# Patient Record
Sex: MALE | Race: WHITE | NOT HISPANIC OR LATINO | Employment: OTHER | URBAN - NONMETROPOLITAN AREA
[De-identification: names, ages, dates, MRNs, and addresses within clinical notes are randomized per-mention and may not be internally consistent; named-entity substitution may affect disease eponyms.]

---

## 2023-12-11 ENCOUNTER — OFFICE VISIT (OUTPATIENT)
Dept: NEUROSURGERY | Facility: CLINIC | Age: 68
End: 2023-12-11
Payer: MEDICARE

## 2023-12-11 VITALS — WEIGHT: 267.2 LBS | HEIGHT: 73 IN | BODY MASS INDEX: 35.41 KG/M2

## 2023-12-11 DIAGNOSIS — M48.061 SPINAL STENOSIS, LUMBAR REGION, WITHOUT NEUROGENIC CLAUDICATION: ICD-10-CM

## 2023-12-11 DIAGNOSIS — E66.09 CLASS 2 OBESITY DUE TO EXCESS CALORIES WITH BODY MASS INDEX (BMI) OF 35.0 TO 35.9 IN ADULT, UNSPECIFIED WHETHER SERIOUS COMORBIDITY PRESENT: ICD-10-CM

## 2023-12-11 DIAGNOSIS — M54.16 LUMBAR RADICULOPATHY: ICD-10-CM

## 2023-12-11 DIAGNOSIS — M51.36 DDD (DEGENERATIVE DISC DISEASE), LUMBAR: Primary | ICD-10-CM

## 2023-12-11 DIAGNOSIS — Z78.9 NONSMOKER: ICD-10-CM

## 2023-12-11 RX ORDER — LIRAGLUTIDE 6 MG/ML
0.6 INJECTION SUBCUTANEOUS DAILY
COMMUNITY

## 2023-12-11 RX ORDER — TESTOSTERONE CYPIONATE 200 MG/ML
VIAL (ML) INTRAMUSCULAR
COMMUNITY
Start: 2023-09-29

## 2023-12-11 RX ORDER — AMOXICILLIN 500 MG
1 CAPSULE ORAL
COMMUNITY

## 2023-12-11 RX ORDER — ASPIRIN 81 MG/1
81 TABLET ORAL DAILY
COMMUNITY
Start: 2023-07-10

## 2023-12-11 RX ORDER — EZETIMIBE 10 MG
TABLET ORAL
COMMUNITY
Start: 2013-01-10

## 2023-12-11 RX ORDER — MAGNESIUM 64 MG (MAGNESIUM CHLORIDE) TABLET,DELAYED RELEASE
128
COMMUNITY
Start: 2016-06-10

## 2023-12-11 RX ORDER — NITROGLYCERIN 0.4 MG/1
TABLET SUBLINGUAL
COMMUNITY
Start: 2009-07-10

## 2023-12-11 RX ORDER — METOPROLOL SUCCINATE 25 MG/1
TABLET, EXTENDED RELEASE ORAL
COMMUNITY
Start: 2023-12-07

## 2023-12-11 RX ORDER — ZOLPIDEM TARTRATE 10 MG/1
10 TABLET ORAL
COMMUNITY
Start: 2003-12-10

## 2023-12-11 RX ORDER — RANOLAZINE 1000 MG/1
TABLET, EXTENDED RELEASE ORAL
COMMUNITY
Start: 2017-09-10

## 2023-12-11 RX ORDER — COLCHICINE 0.6 MG/1
0.6 TABLET ORAL
COMMUNITY

## 2023-12-11 RX ORDER — AMOXICILLIN 500 MG
CAPSULE ORAL
COMMUNITY
Start: 1998-05-10

## 2023-12-11 RX ORDER — FOLIC ACID 1 MG/1
TABLET ORAL
COMMUNITY
Start: 1998-05-10

## 2023-12-11 RX ORDER — FENOFIBRATE 160 MG/1
160 TABLET ORAL DAILY
COMMUNITY
Start: 2013-07-10 | End: 2024-05-15

## 2023-12-11 RX ORDER — POLYETHYLENE GLYCOL 3350, SODIUM CHLORIDE, SODIUM BICARBONATE, POTASSIUM CHLORIDE 420; 11.2; 5.72; 1.48 G/4L; G/4L; G/4L; G/4L
POWDER, FOR SOLUTION ORAL
COMMUNITY
Start: 2023-10-25

## 2023-12-11 RX ORDER — BUPROPION HYDROCHLORIDE 100 MG/1
50 TABLET, EXTENDED RELEASE ORAL DAILY
COMMUNITY
Start: 2016-05-10

## 2023-12-11 RX ORDER — SEMAGLUTIDE 1.34 MG/ML
1 INJECTION, SOLUTION SUBCUTANEOUS
COMMUNITY

## 2023-12-11 RX ORDER — ROSUVASTATIN CALCIUM 40 MG/1
40 TABLET, COATED ORAL DAILY
COMMUNITY
Start: 2013-01-10

## 2023-12-11 RX ORDER — LISINOPRIL 5 MG/1
5 TABLET ORAL DAILY
COMMUNITY
Start: 2009-07-10

## 2023-12-11 RX ORDER — VITAMIN E 268 MG
CAPSULE ORAL
COMMUNITY

## 2023-12-11 RX ORDER — SEMAGLUTIDE 2.68 MG/ML
INJECTION, SOLUTION SUBCUTANEOUS
COMMUNITY
Start: 2023-08-29

## 2023-12-11 RX ORDER — PREDNISONE 5 MG/1
TABLET ORAL
COMMUNITY
Start: 2023-11-20

## 2023-12-11 RX ORDER — POTASSIUM CITRATE 10 MEQ/1
10 TABLET, EXTENDED RELEASE ORAL 4 TIMES DAILY
COMMUNITY
Start: 1998-08-10

## 2023-12-11 RX ORDER — VIT A/VIT C/VIT E/ZINC/COPPER 4296-226
CAPSULE ORAL DAILY
COMMUNITY

## 2023-12-11 RX ORDER — MULTIPLE VITAMINS W/ MINERALS TAB 9MG-400MCG
TAB ORAL
COMMUNITY
Start: 2016-12-10

## 2023-12-11 RX ORDER — CLOPIDOGREL BISULFATE 75 MG/1
TABLET ORAL
COMMUNITY
Start: 2009-07-10

## 2023-12-11 RX ORDER — ALLOPURINOL 300 MG/1
TABLET ORAL
COMMUNITY
Start: 2003-06-10

## 2023-12-11 RX ORDER — TAMSULOSIN HYDROCHLORIDE 0.4 MG/1
0.4 CAPSULE ORAL DAILY
COMMUNITY
Start: 1998-09-10

## 2023-12-11 RX ORDER — VIT A/VIT C/VIT E/ZINC/COPPER 4296-226
1 CAPSULE ORAL DAILY
COMMUNITY

## 2023-12-11 RX ORDER — GABAPENTIN 100 MG/1
CAPSULE ORAL
Qty: 90 CAPSULE | Refills: 0 | Status: SHIPPED | OUTPATIENT
Start: 2023-12-11

## 2023-12-11 NOTE — PROGRESS NOTES
"    Chief complaint:   Chief Complaint   Patient presents with    Back Pain     Pt states having pain in back and legs and sciatic and more of the right leg down to ankle. Pt stated no PT or Pain management         Subjective     HPI: Reynaldo presents as a referral from Dr. Morfin with complaints of low back pain with radiation into the right buttock down the lateral thigh into the lateral calf, terminating at the ankle.  Symptoms started about 8 weeks ago without injury.  He has had some radicular symptoms on the left but never had anything on the right until recently.  He did a prednisone taper which was helpful but still continues with pain, especially with standing and walking and when he lays down at night.  He indicates that if he sits in his chair for too long, the leg pain will also start up.  He does report that the right leg feels somewhat weak.  He denies any paresthesias.  He has not had any conservative treatment.  He would like to avoid surgery if possible.    Review of Systems     Past Medical History:   Diagnosis Date    Arthritis 2004    Cancer     [place] and [place] cell    Coronary artery disease 06/2009    Deep vein thrombosis 1982    Diabetes mellitus 05/2016    Gout 8/1998    Hyperlipidemia 1990’s    Hypertension 1985    Liver disease 10/2022    Lumbar radiculopathy 12/11/2023    Myocardial infarction 01/2013     Past Surgical History:   Procedure Laterality Date    CAROTID STENT  07/2009    I now have 3 stents two were put in in 2017     Family History   Problem Relation Age of Onset    Alcohol abuse Mother     Early death Mother     Alcohol abuse Father     Early death Father     Hearing loss Father     Heart disease Brother      Social History     Tobacco Use    Smoking status: Never   Substance Use Topics    Alcohol use: Not Currently    Drug use: Never     (Not in a hospital admission)    Allergies:  Patient has no known allergies.    Objective      Vital Signs  Ht 185.4 cm (73\")   Wt 121 kg " (267 lb 3.2 oz)   BMI 35.25 kg/m²     Physical Exam  Constitutional:       Appearance: Normal appearance. He is well-developed.   HENT:      Head: Normocephalic.   Eyes:      General: Lids are normal.      Conjunctiva/sclera: Conjunctivae normal.      Pupils: Pupils are equal, round, and reactive to light.   Cardiovascular:      Rate and Rhythm: Normal rate.   Pulmonary:      Effort: Pulmonary effort is normal.      Breath sounds: Normal breath sounds.   Musculoskeletal:         General: No tenderness. Normal range of motion.      Cervical back: Normal range of motion.   Skin:     General: Skin is warm.   Neurological:      Mental Status: He is alert and oriented to person, place, and time.      GCS: GCS eye subscore is 4. GCS verbal subscore is 5. GCS motor subscore is 6.      Cranial Nerves: Cranial nerves 2-12 are intact. No cranial nerve deficit.      Sensory: No sensory deficit.      Motor: Weakness present.      Gait: Gait is intact. Gait normal.      Deep Tendon Reflexes: Reflexes normal.      Reflex Scores:       Patellar reflexes are 1+ on the right side and 2+ on the left side.       Achilles reflexes are 1+ on the right side and 2+ on the left side.  Psychiatric:         Speech: Speech normal.         Behavior: Behavior normal.         Thought Content: Thought content normal.         Neurologic Exam     Mental Status   Oriented to person, place, and time.   Speech: speech is normal   Level of consciousness: alert  Knowledge: good.     Cranial Nerves   Cranial nerves II through XII intact.     CN III, IV, VI   Pupils are equal, round, and reactive to light.    Motor Exam     Strength   Right iliopsoas: 5/5  Left iliopsoas: 5/5  Right quadriceps: 5/5  Left quadriceps: 5/5  Right hamstrin/5  Left hamstrin/5  Right anterior tibial: 5/5  Left anterior tibial: 5/5  Right posterior tibial: 5/5  Left posterior tibial: 5/5  Right gastroc: 5/5  Left gastroc: 5/5No EHL weakness.     Sensory Exam   Light  touch normal.     Gait, Coordination, and Reflexes     Gait  Gait: normal    Reflexes   Right patellar: 1+  Left patellar: 2+  Right achilles: 1+  Left achilles: 2+  Right ankle clonus: absent  Left ankle clonus: absent      Imaging review: MRI of the lumbar spine was reviewed and demonstrates severe disc degeneration L5-S1 with paracentral disc herniation.  There is lateral recess stenosis bilaterally and moderate right neuroforaminal stenosis.  There is moderate central stenosis at L3-4 secondary to facet and ligament hypertrophy with right greater than left lateral recess stenosis and moderate to severe right neuroforaminal stenosis.  There is mild to moderate central stenosis at L4-5 with facet ligament hypertrophy, severe lateral recess stenosis bilaterally and severe right neuroforaminal stenosis.        Assessment/Plan: I reviewed images in detail with Stepan.  He is demonstrating some right hamstrings weakness as well as decreased right patellar and Achilles reflexes.  Neuroforaminal stenosis is most significant on the right at L4-5.  He does of course have severe disc degeneration L5-S1 with paracentral disc herniation.    He would like to avoid surgery if possible.  We discussed conservative treatment.    For first line conservative care of lumbar pain, I would like to send Mr. Lund for a dedicated course of physician directed physical therapy consisting of 2-3 times a week for 4-6 weeks.    Return for reassessment with me after 6-8 weeks after physical therapy.     To assist with pain, I recommend gabapentin 100 mg, 1 to 3 tablets at bedtime.  We will also get him set up with Dr. Diggs for a right lumbar epidural steroid injection.    We discussed avoidance of aggravating activities as well as protective body mechanics.  He will follow-up after the injection with Dr. Gonzalez.    I advised the patient to call and return sooner for new or worsening complaints of weakness, paresthesias, gait disturbances,  or any additional concerns.  Treatment options discussed in detail with Stepan and the patient voiced understanding.  Mr. Lund agrees with this plan of care.     Patient is a nonsmoker    The patient's Body mass index is 35.25 kg/m².. BMI is above normal parameters. Recommendations include: educational material    ADVANCED CARE PLANNING  Information on advance directives provided in AVS.    CONCHITA Fall Risk Assessment was completed, and patient is at MODERATE risk for falls. Assessment completed on:12/11/2023       Diagnoses and all orders for this visit:    1. DDD (degenerative disc disease), lumbar (Primary)  -     Ambulatory Referral to Physical Therapy Evaluate and treat    2. Lumbar radiculopathy  -     Ambulatory Referral to Pain Management  -     gabapentin (NEURONTIN) 100 MG capsule; 1-3 nightly  Dispense: 90 capsule; Refill: 0  -     Ambulatory Referral to Physical Therapy Evaluate and treat    3. Spinal stenosis, lumbar region, without neurogenic claudication    4. Nonsmoker    5. Class 2 obesity due to excess calories with body mass index (BMI) of 35.0 to 35.9 in adult, unspecified whether serious comorbidity present          I discussed the patients findings and my recommendations with patient    Muriel Eubanks PA-C  12/11/23  12:04 CST

## 2024-01-08 DIAGNOSIS — M54.16 LUMBAR RADICULOPATHY: ICD-10-CM

## 2024-01-08 RX ORDER — GABAPENTIN 100 MG/1
CAPSULE ORAL
Qty: 90 CAPSULE | Refills: 0 | Status: SHIPPED | OUTPATIENT
Start: 2024-01-08

## 2024-02-20 ENCOUNTER — OFFICE VISIT (OUTPATIENT)
Dept: NEUROSURGERY | Facility: CLINIC | Age: 69
End: 2024-02-20
Payer: MEDICARE

## 2024-02-20 VITALS — BODY MASS INDEX: 35.39 KG/M2 | WEIGHT: 267 LBS | HEIGHT: 73 IN

## 2024-02-20 DIAGNOSIS — M51.36 DDD (DEGENERATIVE DISC DISEASE), LUMBAR: ICD-10-CM

## 2024-02-20 DIAGNOSIS — Z78.9 NON-SMOKER: ICD-10-CM

## 2024-02-20 DIAGNOSIS — E66.01 CLASS 2 SEVERE OBESITY DUE TO EXCESS CALORIES WITH SERIOUS COMORBIDITY AND BODY MASS INDEX (BMI) OF 35.0 TO 35.9 IN ADULT: ICD-10-CM

## 2024-02-20 DIAGNOSIS — M54.16 LUMBAR RADICULOPATHY: Primary | ICD-10-CM

## 2024-02-20 PROCEDURE — 1160F RVW MEDS BY RX/DR IN RCRD: CPT | Performed by: NEUROLOGICAL SURGERY

## 2024-02-20 PROCEDURE — 1159F MED LIST DOCD IN RCRD: CPT | Performed by: NEUROLOGICAL SURGERY

## 2024-02-20 PROCEDURE — 99214 OFFICE O/P EST MOD 30 MIN: CPT | Performed by: NEUROLOGICAL SURGERY

## 2024-02-20 RX ORDER — GABAPENTIN 300 MG/1
300 CAPSULE ORAL
COMMUNITY
Start: 2024-02-06

## 2024-02-20 NOTE — PROGRESS NOTES
"    Chief complaint:   Chief Complaint   Patient presents with    Follow-up     Follow up low back and bilateral leg and sciatic pain worse in right side        Subjective     HPI: I had a chance to see Reynaldo today in follow-up and to review his imaging studies of the cervical spine.  Reynaldo does have a disc herniation at L5/S1 however he is slowly getting better and the injections have helped dramatically with his pain.    Review of Systems      Objective      Vital Signs  Ht 185.4 cm (73\")   Wt 121 kg (267 lb)   BMI 35.23 kg/m²     Physical Exam  Neurological:      Mental Status: He is oriented to person, place, and time.      Cranial Nerves: Cranial nerves 2-12 are intact.      Motor: Motor strength is normal.     Gait: Gait is intact.   Psychiatric:         Speech: Speech normal.         Neurologic Exam     Mental Status   Oriented to person, place, and time.   Attention: normal. Concentration: normal.   Speech: speech is normal   Level of consciousness: alert  Knowledge: good.   Normal comprehension.     Cranial Nerves   Cranial nerves II through XII intact.     Motor Exam     Strength   Strength 5/5 throughout.     Sensory Exam   Light touch normal.     Gait, Coordination, and Reflexes     Gait  Gait: normal      Imaging review: Multilevel disc degeneration most severe at L5/S1 with a disc herniation central into the left.  No severe central canal stenosis        Assessment/Plan:   L5/S1 disc herniation with radiculopathy    Reynaldo is doing very well at this time and I think we can continue with conservative management for now.  I would like to see him back in 8 weeks to make sure he is still improving.  I look forward to seeing him at his next visit.    Patient is a nonsmoker  The patient's Body mass index is 35.23 kg/m².. BMI is above normal parameters. Recommendations include: continue with current weight loss program    Diagnoses and all orders for this visit:    1. Lumbar radiculopathy (Primary)    2. Class 2 " severe obesity due to excess calories with serious comorbidity and body mass index (BMI) of 35.0 to 35.9 in adult    3. Non-smoker    4. DDD (degenerative disc disease), lumbar        I discussed the patients findings and my recommendations with patient  Ezra Gonzalez DO  02/20/24  13:01 CST

## 2024-05-24 ENCOUNTER — TELEPHONE (OUTPATIENT)
Dept: NEUROSURGERY | Facility: CLINIC | Age: 69
End: 2024-05-24
Payer: MEDICARE

## 2024-05-24 NOTE — TELEPHONE ENCOUNTER
LVM TO MOVE APPT FROM THE 17TH TO JUNE 4TH   It is okay for the hub to relay the message to the patient and schedule if possible